# Patient Record
(demographics unavailable — no encounter records)

---

## 2025-06-11 NOTE — PHYSICAL EXAM
[1+] : 1+ [Normal Gait and Station] : normal gait and station [Normal muscle strength, symmetry and tone of facial, head and neck musculature] : normal muscle strength, symmetry and tone of facial, head and neck musculature [Normal] : no cervical lymphadenopathy [Exposed Vessel] : left anterior vessel not exposed [Increased Work of Breathing] : no increased work of breathing with use of accessory muscles and retractions

## 2025-06-11 NOTE — PROCEDURE
[FreeTextEntry1] : Flexible fiberoptic laryngoscopy [FreeTextEntry2] : Throat discomfort [FreeTextEntry3] :  After verbal consent was obtained, the flexible fiberoptic laryngoscope was passed and the following was seen:   Nasal passage: clear without lesions or drainage, choanae patent bilaterally, edematous mucosa, mild inferior turbinate hyeprtrophy Nasopharynx: Adenoids with 20 % obstruction, normal closure of the velopharyngeal sphincter Supraglottis: Normal shape and mucosalization of the epiglottis, normal appearing aryepiglottic folds and arytenoids with normal sensation Glottis: Normal mobility of right and left vocal cords. No vocal cord lesions. Subglottis: Subglottis as able to visualize appears patent. Hypopharynx: Mild post-cricoid edema, no pooling of secretion    The patient tolerated the procedure well without complications.

## 2025-06-11 NOTE — HISTORY OF PRESENT ILLNESS
[de-identified] : Adeel is a 13 year old boy here for evaluation of throat discomfort. Here with mom who provides history through .   Symptoms started in March 2025 after traveling to [facility].  Feels like something is stuck in the throat.  Feeling is there every day.  Some hoarseness in the morning.  No choking episodes.  Some cough and frequent throat clearing.  Not worse at a certain time of day.  Not worse after certain foods.  Denies nasal congestion or allergies.  Nothing has made it feel better.   Denies snoring or pauses in breathing.  No throat infections or ear infections.  Not using Flonase or any nasal sprays.  Not on reflux medication.  No other otolaryngology concerns.

## 2025-06-11 NOTE — REASON FOR VISIT
[Initial Consultation] : an initial consultation for [Patient] : patient [Mother] : mother [FreeTextEntry2] : throat discomfort

## 2025-06-11 NOTE — ASSESSMENT
[FreeTextEntry1] : We reviewed nasal endoscopy and laryngoscopy findings. Edematous nasal mucosa and post nasal drip with clear mucous. No evidence of airway foreign body. Discussed throat discomfort likely secondary to postnasal drip.  I recommend the use of Flonase one spray per nostril once per day. We reviewed the proper administration techniques and a prescription was provided. Follow up in 2-3 months.

## 2025-06-11 NOTE — CONSULT LETTER
[Consult Letter:] : I had the pleasure of evaluating your patient, [unfilled]. [Please see my note below.] : Please see my note below. [Consult Closing:] : Thank you very much for allowing me to participate in the care of this patient.  If you have any questions, please do not hesitate to contact me. [Sincerely,] : Sincerely, [FreeTextEntry3] : Esperanza Silveira M.D. [place] [address] Tel [phone] Fax [phone]